# Patient Record
Sex: FEMALE | Race: BLACK OR AFRICAN AMERICAN | NOT HISPANIC OR LATINO
[De-identification: names, ages, dates, MRNs, and addresses within clinical notes are randomized per-mention and may not be internally consistent; named-entity substitution may affect disease eponyms.]

---

## 2020-09-09 ENCOUNTER — TRANSCRIPTION ENCOUNTER (OUTPATIENT)
Age: 25
End: 2020-09-09

## 2020-09-09 ENCOUNTER — NON-APPOINTMENT (OUTPATIENT)
Age: 25
End: 2020-09-09

## 2020-09-09 ENCOUNTER — APPOINTMENT (OUTPATIENT)
Dept: OBGYN | Facility: CLINIC | Age: 25
End: 2020-09-09
Payer: MEDICAID

## 2020-09-09 VITALS
SYSTOLIC BLOOD PRESSURE: 120 MMHG | TEMPERATURE: 99 F | DIASTOLIC BLOOD PRESSURE: 80 MMHG | BODY MASS INDEX: 32.62 KG/M2 | WEIGHT: 203 LBS | HEIGHT: 66 IN

## 2020-09-09 PROCEDURE — 0502F SUBSEQUENT PRENATAL CARE: CPT

## 2020-09-09 PROCEDURE — 36415 COLL VENOUS BLD VENIPUNCTURE: CPT

## 2020-09-09 PROCEDURE — 81015 MICROSCOPIC EXAM OF URINE: CPT

## 2020-09-09 PROCEDURE — 99213 OFFICE O/P EST LOW 20 MIN: CPT

## 2020-09-10 LAB — HIV1+2 AB SPEC QL IA.RAPID: NONREACTIVE

## 2020-09-11 LAB
ABO + RH PNL BLD: NORMAL
BASOPHILS # BLD AUTO: 0.03 K/UL
BASOPHILS NFR BLD AUTO: 0.4 %
BLD GP AB SCN SERPL QL: NORMAL
EOSINOPHIL # BLD AUTO: 0.21 K/UL
EOSINOPHIL NFR BLD AUTO: 2.5 %
GLUCOSE 1H P 50 G GLC PO SERPL-MCNC: 112 MG/DL
HCT VFR BLD CALC: 35 %
HGB BLD-MCNC: 10.8 G/DL
IMM GRANULOCYTES NFR BLD AUTO: 0.4 %
LYMPHOCYTES # BLD AUTO: 2.09 K/UL
LYMPHOCYTES NFR BLD AUTO: 25.1 %
MAN DIFF?: NORMAL
MCHC RBC-ENTMCNC: 29 PG
MCHC RBC-ENTMCNC: 30.9 GM/DL
MCV RBC AUTO: 93.8 FL
MONOCYTES # BLD AUTO: 0.41 K/UL
MONOCYTES NFR BLD AUTO: 4.9 %
NEUTROPHILS # BLD AUTO: 5.57 K/UL
NEUTROPHILS NFR BLD AUTO: 66.7 %
PLATELET # BLD AUTO: 235 K/UL
RBC # BLD: 3.73 M/UL
RBC # FLD: 14.4 %
SARS-COV-2 IGG SERPL IA-ACNC: <0.1 INDEX
SARS-COV-2 IGG SERPL QL IA: NEGATIVE
T PALLIDUM AB SER QL IA: NEGATIVE
WBC # FLD AUTO: 8.34 K/UL

## 2020-09-24 ENCOUNTER — NON-APPOINTMENT (OUTPATIENT)
Age: 25
End: 2020-09-24

## 2020-09-25 ENCOUNTER — RESULT REVIEW (OUTPATIENT)
Age: 25
End: 2020-09-25

## 2020-09-25 ENCOUNTER — APPOINTMENT (OUTPATIENT)
Dept: HEMATOLOGY ONCOLOGY | Facility: CLINIC | Age: 25
End: 2020-09-25
Payer: MEDICAID

## 2020-09-25 VITALS
WEIGHT: 205 LBS | HEIGHT: 66.93 IN | DIASTOLIC BLOOD PRESSURE: 64 MMHG | TEMPERATURE: 97.3 F | HEART RATE: 100 BPM | SYSTOLIC BLOOD PRESSURE: 104 MMHG | OXYGEN SATURATION: 98 % | BODY MASS INDEX: 32.18 KG/M2 | RESPIRATION RATE: 20 BRPM

## 2020-09-25 DIAGNOSIS — Z81.8 FAMILY HISTORY OF OTHER MENTAL AND BEHAVIORAL DISORDERS: ICD-10-CM

## 2020-09-25 DIAGNOSIS — Z80.0 FAMILY HISTORY OF MALIGNANT NEOPLASM OF DIGESTIVE ORGANS: ICD-10-CM

## 2020-09-25 DIAGNOSIS — Z83.3 FAMILY HISTORY OF DIABETES MELLITUS: ICD-10-CM

## 2020-09-25 DIAGNOSIS — Z86.2 PERSONAL HISTORY OF DISEASES OF THE BLOOD AND BLOOD-FORMING ORGANS AND CERTAIN DISORDERS INVOLVING THE IMMUNE MECHANISM: ICD-10-CM

## 2020-09-25 PROCEDURE — 99205 OFFICE O/P NEW HI 60 MIN: CPT

## 2020-09-25 RX ORDER — PNV/FERROUS SULFATE/FOLIC ACID 27-<0.5MG
TABLET ORAL
Refills: 0 | Status: ACTIVE | COMMUNITY

## 2020-09-25 NOTE — HISTORY OF PRESENT ILLNESS
[de-identified] : 25 year old female presents today for initial consultation of anemia during pregnancy, referred by Dr. Yang.  Patient is currently 29 weeks pregnant with an AVIVA of 12/6/2020.  Labs dated 9/10/2020 show a HGB 10.8, normal HCT and MCV. No ferritin on chart.   Patient complains of bad headaches, excessive fatigue and feeling cold.  Denies any chest pain or palpitations.  Patient states her last pregnancy she was not told she was anemic during- however after delivery she was told she was and she took po iron- unsure how she tolerated .   [de-identified] : \par

## 2020-09-25 NOTE — PHYSICAL EXAM
[Fully active, able to carry on all pre-disease performance without restriction] : Status 0 - Fully active, able to carry on all pre-disease performance without restriction [Normal] : affect appropriate [de-identified] : gravid

## 2020-09-25 NOTE — CONSULT LETTER
[Dear  ___] : Dear  [unfilled], [Consult Letter:] : I had the pleasure of evaluating your patient, [unfilled]. [Please see my note below.] : Please see my note below. [Consult Closing:] : Thank you very much for allowing me to participate in the care of this patient.  If you have any questions, please do not hesitate to contact me. [Sincerely,] : Sincerely, [FreeTextEntry3] : Kayla Thurman DO, F[place], FACP\par Medical Oncology and Hematology\par  [facility] [place]\par

## 2020-09-25 NOTE — ASSESSMENT
[FreeTextEntry1] : #anemia in pregnancy\par We have reviewed the diagnosis and causes of anemia. We have discussed that physiologic anemia of pregnancy and iron deficiency are the two most common causes of anemia in pregnant women. Will check CBC with diff, CMP, LDH, Haptoglobin, retic, iron studies with ferritin, B12/Folate.\par If her iron studies reveal iron deficiency, given her degree of anemia, would recommend parenteral iron in the form of venofer 200 mg administered x 5 doses. \par We have reviewed the side effect of venofer to include but are not limited to infusion reaction, headaches, nausea. \par \par #3rd trimester of pregnancy - follows with Dr. Yang\par \par #fatigue - 2/2 to above\par Will have her return in 1 week to discuss work up and for her to receive iron if needed.

## 2020-09-29 ENCOUNTER — NON-APPOINTMENT (OUTPATIENT)
Age: 25
End: 2020-09-29

## 2020-09-30 ENCOUNTER — APPOINTMENT (OUTPATIENT)
Dept: OBGYN | Facility: CLINIC | Age: 25
End: 2020-09-30

## 2020-10-02 ENCOUNTER — RESULT REVIEW (OUTPATIENT)
Age: 25
End: 2020-10-02

## 2020-10-02 ENCOUNTER — APPOINTMENT (OUTPATIENT)
Dept: HEMATOLOGY ONCOLOGY | Facility: CLINIC | Age: 25
End: 2020-10-02
Payer: MEDICAID

## 2020-10-02 VITALS
BODY MASS INDEX: 32.65 KG/M2 | RESPIRATION RATE: 20 BRPM | DIASTOLIC BLOOD PRESSURE: 60 MMHG | HEIGHT: 66.93 IN | OXYGEN SATURATION: 98 % | SYSTOLIC BLOOD PRESSURE: 102 MMHG | TEMPERATURE: 97.9 F | WEIGHT: 208 LBS | HEART RATE: 105 BPM

## 2020-10-02 PROCEDURE — 99214 OFFICE O/P EST MOD 30 MIN: CPT

## 2020-10-02 RX ADMIN — Medication 0 1 ML: at 00:00

## 2020-10-02 NOTE — PHYSICAL EXAM
[Fully active, able to carry on all pre-disease performance without restriction] : Status 0 - Fully active, able to carry on all pre-disease performance without restriction [Normal] : affect appropriate [de-identified] : gravid

## 2020-10-02 NOTE — CONSULT LETTER
[Dear  ___] : Dear  [unfilled], [Consult Letter:] : I had the pleasure of evaluating your patient, [unfilled]. [Please see my note below.] : Please see my note below. [Consult Closing:] : Thank you very much for allowing me to participate in the care of this patient.  If you have any questions, please do not hesitate to contact me. [Sincerely,] : Sincerely, [FreeTextEntry3] : Kayla Thurman DO, [place], FACP\par Medical Oncology and Hematology\par [facility] [place]\par

## 2020-10-02 NOTE — ASSESSMENT
[FreeTextEntry1] : #anemia in pregnancy\par work up reveals b12 deficiency - will give B12 injections 1000 mcg SC daily x 7 days followed by weekly for 4 weeks followed by monthly.\par Iron levels and ferritin still wnl. will recheck in 4 weeks to see if she needs iron\par ESR/CRP elevated - likely due to pregancy\par \par #3rd trimester of pregnancy - follows with Dr. Yang\par Had recent contractions - started on nifedipine and contractions stopped\par \par #fatigue - 2/2 to above\par \par RTC in 1 month with cbc with diff, cmp, iron studies, ferritin, b12, ESR/CRP

## 2020-10-02 NOTE — HISTORY OF PRESENT ILLNESS
[de-identified] : 25 year old female presents today for initial consultation of anemia during pregnancy, referred by Dr. Yang.  Patient is currently 29 weeks pregnant with an AVIVA of 12/6/2020.  Labs dated 9/10/2020 show a HGB 10.8, normal HCT and MCV. No ferritin on chart.   Patient complains of bad headaches, excessive fatigue and feeling cold.  Denies any chest pain or palpitations.  Patient states her last pregnancy she was not told she was anemic during- however after delivery she was told she was and she took po iron- unsure how she tolerated .   [de-identified] : Patient seen and examined and here today for follow up\par Was recently hospitalized for premature contractions\par Given nifedipine. Feeling better now

## 2020-10-05 ENCOUNTER — NON-APPOINTMENT (OUTPATIENT)
Age: 25
End: 2020-10-05

## 2020-10-05 ENCOUNTER — APPOINTMENT (OUTPATIENT)
Dept: OBGYN | Facility: CLINIC | Age: 25
End: 2020-10-05
Payer: MEDICAID

## 2020-10-05 VITALS
DIASTOLIC BLOOD PRESSURE: 80 MMHG | TEMPERATURE: 99.4 F | BODY MASS INDEX: 33.11 KG/M2 | HEIGHT: 66 IN | SYSTOLIC BLOOD PRESSURE: 122 MMHG | WEIGHT: 206 LBS

## 2020-10-05 PROCEDURE — 0502F SUBSEQUENT PRENATAL CARE: CPT

## 2020-10-06 RX ORDER — CYANOCOBALAMIN, ISOPROPYL ALCOHOL 1000MCG/ML
1000 KIT INJECTION
Qty: 20 | Refills: 0 | Status: COMPLETED | COMMUNITY
Start: 2020-10-02 | End: 2020-10-06

## 2020-10-08 ENCOUNTER — NON-APPOINTMENT (OUTPATIENT)
Age: 25
End: 2020-10-08

## 2020-10-23 ENCOUNTER — APPOINTMENT (OUTPATIENT)
Dept: OBGYN | Facility: CLINIC | Age: 25
End: 2020-10-23
Payer: MEDICAID

## 2020-10-23 ENCOUNTER — NON-APPOINTMENT (OUTPATIENT)
Age: 25
End: 2020-10-23

## 2020-10-23 VITALS
TEMPERATURE: 98.7 F | BODY MASS INDEX: 33.75 KG/M2 | WEIGHT: 210 LBS | SYSTOLIC BLOOD PRESSURE: 120 MMHG | DIASTOLIC BLOOD PRESSURE: 80 MMHG | HEIGHT: 66 IN

## 2020-10-23 PROCEDURE — 0502F SUBSEQUENT PRENATAL CARE: CPT

## 2020-11-13 ENCOUNTER — APPOINTMENT (OUTPATIENT)
Dept: OBGYN | Facility: CLINIC | Age: 25
End: 2020-11-13
Payer: MEDICAID

## 2020-11-13 ENCOUNTER — NON-APPOINTMENT (OUTPATIENT)
Age: 25
End: 2020-11-13

## 2020-11-13 VITALS
BODY MASS INDEX: 34.23 KG/M2 | WEIGHT: 213 LBS | SYSTOLIC BLOOD PRESSURE: 114 MMHG | HEIGHT: 66 IN | DIASTOLIC BLOOD PRESSURE: 62 MMHG

## 2020-11-13 DIAGNOSIS — Z00.00 ENCOUNTER FOR GENERAL ADULT MEDICAL EXAMINATION W/OUT ABNORMAL FINDINGS: ICD-10-CM

## 2020-11-13 LAB
BILIRUB UR QL STRIP: NEGATIVE
CLARITY UR: CLEAR
COLLECTION METHOD: NORMAL
GLUCOSE UR-MCNC: NEGATIVE
HCG UR QL: 0.2 EU/DL
HGB UR QL STRIP.AUTO: NEGATIVE
KETONES UR-MCNC: NEGATIVE
LEUKOCYTE ESTERASE UR QL STRIP: NORMAL
NITRITE UR QL STRIP: NEGATIVE
PH UR STRIP: 7
PROT UR STRIP-MCNC: NEGATIVE
SP GR UR STRIP: 1.02

## 2020-11-13 PROCEDURE — 81015 MICROSCOPIC EXAM OF URINE: CPT

## 2020-11-13 PROCEDURE — 0502F SUBSEQUENT PRENATAL CARE: CPT

## 2020-11-16 ENCOUNTER — NON-APPOINTMENT (OUTPATIENT)
Age: 25
End: 2020-11-16

## 2020-11-16 LAB
BACTERIA UR CULT: NORMAL
GP B STREP DNA SPEC QL NAA+PROBE: DETECTED
GP B STREP DNA SPEC QL NAA+PROBE: NORMAL
SOURCE GBS: NORMAL

## 2020-11-19 ENCOUNTER — APPOINTMENT (OUTPATIENT)
Dept: OBGYN | Facility: CLINIC | Age: 25
End: 2020-11-19
Payer: MEDICAID

## 2020-11-19 ENCOUNTER — NON-APPOINTMENT (OUTPATIENT)
Age: 25
End: 2020-11-19

## 2020-11-19 VITALS
HEIGHT: 66 IN | DIASTOLIC BLOOD PRESSURE: 66 MMHG | WEIGHT: 212 LBS | BODY MASS INDEX: 34.07 KG/M2 | SYSTOLIC BLOOD PRESSURE: 110 MMHG

## 2020-11-19 LAB
BILIRUB UR QL STRIP: NEGATIVE
CLARITY UR: NORMAL
COLLECTION METHOD: NORMAL
GLUCOSE UR-MCNC: NEGATIVE
HCG UR QL: 0.2 EU/DL
HGB UR QL STRIP.AUTO: NEGATIVE
KETONES UR-MCNC: NEGATIVE
LEUKOCYTE ESTERASE UR QL STRIP: NORMAL
NITRITE UR QL STRIP: NEGATIVE
PH UR STRIP: 7
PROT UR STRIP-MCNC: NEGATIVE
SP GR UR STRIP: 1.02

## 2020-11-19 PROCEDURE — 0502F SUBSEQUENT PRENATAL CARE: CPT

## 2020-11-20 ENCOUNTER — RESULT REVIEW (OUTPATIENT)
Age: 25
End: 2020-11-20

## 2020-11-20 ENCOUNTER — APPOINTMENT (OUTPATIENT)
Dept: HEMATOLOGY ONCOLOGY | Facility: CLINIC | Age: 25
End: 2020-11-20
Payer: MEDICAID

## 2020-11-20 VITALS
WEIGHT: 215.06 LBS | OXYGEN SATURATION: 98 % | BODY MASS INDEX: 34.56 KG/M2 | DIASTOLIC BLOOD PRESSURE: 83 MMHG | HEIGHT: 66 IN | SYSTOLIC BLOOD PRESSURE: 124 MMHG | RESPIRATION RATE: 16 BRPM | TEMPERATURE: 97.4 F | HEART RATE: 128 BPM

## 2020-11-20 DIAGNOSIS — Z34.93 ENCOUNTER FOR SUPERVISION OF NORMAL PREGNANCY, UNSPECIFIED, THIRD TRIMESTER: ICD-10-CM

## 2020-11-20 PROCEDURE — 99214 OFFICE O/P EST MOD 30 MIN: CPT

## 2020-11-20 NOTE — HISTORY OF PRESENT ILLNESS
[de-identified] : 25 year old female presents today for initial consultation of anemia during pregnancy, referred by Dr. Yang.  Patient is currently 29 weeks pregnant with an AVIVA of 12/6/2020.  Labs dated 9/10/2020 show a HGB 10.8, normal HCT and MCV. No ferritin on chart.   Patient complains of bad headaches, excessive fatigue and feeling cold.  Denies any chest pain or palpitations.  Patient states her last pregnancy she was not told she was anemic during- however after delivery she was told she was and she took po iron- unsure how she tolerated .   [de-identified] : Patient seen and examined and here today for follow up.  She reports feeling well and is now 37 weeks gestation.\par

## 2020-11-20 NOTE — ASSESSMENT
[FreeTextEntry1] : #anemia in pregnancy\par work up reveals b12 deficiency - s/p B12 injections 1000 mcg SC daily x 4 days (due to insurance) followed by weekly for 5 weeks, followed by monthly starting in december.\par Iron levels and ferritin still wnl. will recheck in 4 weeks to see if she needs iron\par ESR/CRP elevated - likely due to pregnancy\par \par #3rd trimester of pregnancy - follows with Dr. Yang\par Had recent contractions - started on nifedipine and contractions stopped\par \par #fatigue - 2/2 to above\par \par Case and management discussed with Dr. Thurman\par RTC when 6-8 weeks postpartum with cbc with diff, cmp, iron studies, ferritin, b12, ESR/CRP

## 2020-11-20 NOTE — PHYSICAL EXAM
[Fully active, able to carry on all pre-disease performance without restriction] : Status 0 - Fully active, able to carry on all pre-disease performance without restriction [Normal] : affect appropriate [de-identified] :  abdomen

## 2020-11-27 ENCOUNTER — NON-APPOINTMENT (OUTPATIENT)
Age: 25
End: 2020-11-27

## 2020-11-27 ENCOUNTER — APPOINTMENT (OUTPATIENT)
Dept: OBGYN | Facility: CLINIC | Age: 25
End: 2020-11-27
Payer: MEDICAID

## 2020-11-27 VITALS
HEIGHT: 66 IN | WEIGHT: 215 LBS | BODY MASS INDEX: 34.55 KG/M2 | SYSTOLIC BLOOD PRESSURE: 126 MMHG | DIASTOLIC BLOOD PRESSURE: 68 MMHG

## 2020-11-27 LAB
BILIRUB UR QL STRIP: NEGATIVE
CLARITY UR: CLEAR
COLLECTION METHOD: NORMAL
GLUCOSE UR-MCNC: NEGATIVE
HCG UR QL: 0.2 EU/DL
HGB UR QL STRIP.AUTO: NEGATIVE
KETONES UR-MCNC: NEGATIVE
LEUKOCYTE ESTERASE UR QL STRIP: NORMAL
NITRITE UR QL STRIP: NEGATIVE
PH UR STRIP: 7.5
PROT UR STRIP-MCNC: NORMAL
SP GR UR STRIP: 1.02

## 2020-11-27 PROCEDURE — 0502F SUBSEQUENT PRENATAL CARE: CPT

## 2020-11-27 PROCEDURE — 81015 MICROSCOPIC EXAM OF URINE: CPT

## 2020-11-30 ENCOUNTER — NON-APPOINTMENT (OUTPATIENT)
Age: 25
End: 2020-11-30

## 2020-11-30 ENCOUNTER — APPOINTMENT (OUTPATIENT)
Dept: OBGYN | Facility: CLINIC | Age: 25
End: 2020-11-30
Payer: MEDICAID

## 2020-11-30 VITALS
BODY MASS INDEX: 34.72 KG/M2 | SYSTOLIC BLOOD PRESSURE: 110 MMHG | HEIGHT: 66 IN | WEIGHT: 216 LBS | DIASTOLIC BLOOD PRESSURE: 60 MMHG

## 2020-11-30 DIAGNOSIS — B95.1 STREPTOCOCCUS, GROUP B, AS THE CAUSE OF DISEASES CLASSIFIED ELSEWHERE: ICD-10-CM

## 2020-11-30 DIAGNOSIS — Z34.83 ENCOUNTER FOR SUPERVISION OF OTHER NORMAL PREGNANCY, THIRD TRIMESTER: ICD-10-CM

## 2020-11-30 LAB
BILIRUB UR QL STRIP: NORMAL
CLARITY UR: CLEAR
COLLECTION METHOD: NORMAL
GLUCOSE UR-MCNC: NORMAL
HCG UR QL: 0.2 EU/DL
HGB UR QL STRIP.AUTO: NORMAL
KETONES UR-MCNC: NORMAL
LEUKOCYTE ESTERASE UR QL STRIP: NORMAL
NITRITE UR QL STRIP: NORMAL
PH UR STRIP: 7
PROT UR STRIP-MCNC: NORMAL
SP GR UR STRIP: 1.02

## 2020-11-30 PROCEDURE — 59426 ANTEPARTUM CARE ONLY: CPT

## 2020-11-30 PROCEDURE — 0502F SUBSEQUENT PRENATAL CARE: CPT

## 2020-12-09 ENCOUNTER — APPOINTMENT (OUTPATIENT)
Dept: OBGYN | Facility: CLINIC | Age: 25
End: 2020-12-09

## 2021-01-15 ENCOUNTER — APPOINTMENT (OUTPATIENT)
Dept: OBGYN | Facility: CLINIC | Age: 26
End: 2021-01-15
Payer: MEDICAID

## 2021-01-15 VITALS
BODY MASS INDEX: 33.27 KG/M2 | SYSTOLIC BLOOD PRESSURE: 104 MMHG | DIASTOLIC BLOOD PRESSURE: 60 MMHG | WEIGHT: 207 LBS | HEIGHT: 66 IN

## 2021-01-15 DIAGNOSIS — Z3A.39 39 WEEKS GESTATION OF PREGNANCY: ICD-10-CM

## 2021-01-15 DIAGNOSIS — Z3A.30 30 WEEKS GESTATION OF PREGNANCY: ICD-10-CM

## 2021-01-15 DIAGNOSIS — Z3A.27 27 WEEKS GESTATION OF PREGNANCY: ICD-10-CM

## 2021-01-15 PROCEDURE — 0503F POSTPARTUM CARE VISIT: CPT

## 2021-01-19 ENCOUNTER — RX CHANGE (OUTPATIENT)
Age: 26
End: 2021-01-19

## 2021-01-20 ENCOUNTER — RX CHANGE (OUTPATIENT)
Age: 26
End: 2021-01-20

## 2021-02-22 ENCOUNTER — APPOINTMENT (OUTPATIENT)
Dept: HEMATOLOGY ONCOLOGY | Facility: CLINIC | Age: 26
End: 2021-02-22
Payer: MEDICAID

## 2021-02-22 ENCOUNTER — RESULT REVIEW (OUTPATIENT)
Age: 26
End: 2021-02-22

## 2021-02-22 VITALS
DIASTOLIC BLOOD PRESSURE: 73 MMHG | HEART RATE: 84 BPM | SYSTOLIC BLOOD PRESSURE: 112 MMHG | TEMPERATURE: 96.7 F | BODY MASS INDEX: 33.24 KG/M2 | HEIGHT: 67.2 IN | WEIGHT: 214.3 LBS | RESPIRATION RATE: 16 BRPM | OXYGEN SATURATION: 97 %

## 2021-02-22 DIAGNOSIS — O99.013 ANEMIA COMPLICATING PREGNANCY, THIRD TRIMESTER: ICD-10-CM

## 2021-02-22 PROCEDURE — 99072 ADDL SUPL MATRL&STAF TM PHE: CPT

## 2021-02-22 PROCEDURE — 99212 OFFICE O/P EST SF 10 MIN: CPT

## 2021-02-22 NOTE — HISTORY OF PRESENT ILLNESS
[de-identified] : 25 year old female presents today for initial consultation of anemia during pregnancy, referred by Dr. Yang.  Patient is currently 29 weeks pregnant with an AVIVA of 12/6/2020.  Labs dated 9/10/2020 show a HGB 10.8, normal HCT and MCV. No ferritin on chart.   Patient complains of bad headaches, excessive fatigue and feeling cold.  Denies any chest pain or palpitations.  Patient states her last pregnancy she was not told she was anemic during- however after delivery she was told she was and she took po iron- unsure how she tolerated .   [de-identified] : Patient seen and examined and here today for follow up.  She reports feeling well, her delivery went well without any complications, and her son is now 8 weeks old.  \par She denies feeling dizzy, lightheaded, SOB, palpitations, nausea, vomiting, constipation, diarrhea, and bleeding. She needs to follow up with her ob/gyn for postpartum visit. \par

## 2021-02-22 NOTE — ASSESSMENT
[FreeTextEntry1] : #anemia in pregnancy\par work up reveals b12 deficiency - s/p B12 injections 1000 mcg SC daily x 4 days (due to insurance) followed by weekly for 5 weeks, followed by monthly starting in december.\par Iron levels and ferritin still wnl. will recheck in 4 weeks to see if she needs iron\par ESR/CRP elevated - likely due to pregnancy\par \par #postpartum - follows with Dr. Yang\par to start OCP\par \par #fatigue - 2/2 to above\par \par Case and management discussed with Dr. Thurman\par RTC when 3-4 mos cbc with diff, cmp, iron studies, ferritin, b12, ESR/CRP

## 2021-06-22 ENCOUNTER — APPOINTMENT (OUTPATIENT)
Dept: HEMATOLOGY ONCOLOGY | Facility: CLINIC | Age: 26
End: 2021-06-22
Payer: MEDICAID

## 2021-07-13 ENCOUNTER — RESULT REVIEW (OUTPATIENT)
Age: 26
End: 2021-07-13

## 2021-07-13 ENCOUNTER — APPOINTMENT (OUTPATIENT)
Dept: HEMATOLOGY ONCOLOGY | Facility: CLINIC | Age: 26
End: 2021-07-13
Payer: MEDICAID

## 2021-07-13 VITALS
WEIGHT: 212 LBS | BODY MASS INDEX: 32.89 KG/M2 | SYSTOLIC BLOOD PRESSURE: 103 MMHG | TEMPERATURE: 97.6 F | DIASTOLIC BLOOD PRESSURE: 66 MMHG | OXYGEN SATURATION: 100 % | HEART RATE: 67 BPM | RESPIRATION RATE: 16 BRPM | HEIGHT: 67.17 IN

## 2021-07-13 PROCEDURE — 99072 ADDL SUPL MATRL&STAF TM PHE: CPT

## 2021-07-13 PROCEDURE — 99214 OFFICE O/P EST MOD 30 MIN: CPT

## 2021-07-13 NOTE — ASSESSMENT
[FreeTextEntry1] : #anemia in pregnancy\par work up reveals b12 deficiency - s/p B12 injections 1000 mcg SC daily x 4 days (due to insurance) followed by weekly for 5 weeks, followed by monthly starting in december.\par Iron levels and ferritin still wnl. will recheck in 4 weeks to see if she needs iron\par ESR/CRP elevated - likely due to pregnancy\par \par 7/13/2021 - hgb 11.6, checking iron, ferritin, b12. complains of fatigue- Will add vit D and TSH. will likely need to resume b12 injections if low normal. Does wake up in the middle of the night due to the baby getting up\par \par #postpartum - follows with Dr. Yang\par \par #fatigue - 2/2 to above\par \par RTC when 4 mos cbc with diff, cmp, iron studies, ferritin, b12, ESR/CRP, TSH, vit D

## 2021-07-13 NOTE — HISTORY OF PRESENT ILLNESS
[de-identified] : 25 year old female presents today for initial consultation of anemia during pregnancy, referred by Dr. Yang.  Patient is currently 29 weeks pregnant with an AVIVA of 12/6/2020.  Labs dated 9/10/2020 show a HGB 10.8, normal HCT and MCV. No ferritin on chart.   Patient complains of bad headaches, excessive fatigue and feeling cold.  Denies any chest pain or palpitations.  Patient states her last pregnancy she was not told she was anemic during- however after delivery she was told she was and she took po iron- unsure how she tolerated .   [de-identified] : Patient seen and examined and here today for follow up. \par Complains of fatigue. Does wake up in the middle of the night due to the baby getting up.\par

## 2021-10-15 ENCOUNTER — APPOINTMENT (OUTPATIENT)
Dept: HEMATOLOGY ONCOLOGY | Facility: CLINIC | Age: 26
End: 2021-10-15

## 2021-12-03 ENCOUNTER — RESULT REVIEW (OUTPATIENT)
Age: 26
End: 2021-12-03

## 2021-12-03 ENCOUNTER — APPOINTMENT (OUTPATIENT)
Dept: HEMATOLOGY ONCOLOGY | Facility: CLINIC | Age: 26
End: 2021-12-03
Payer: MEDICAID

## 2021-12-03 VITALS
HEIGHT: 67.17 IN | TEMPERATURE: 97.3 F | BODY MASS INDEX: 30.95 KG/M2 | WEIGHT: 199.5 LBS | SYSTOLIC BLOOD PRESSURE: 120 MMHG | OXYGEN SATURATION: 99 % | DIASTOLIC BLOOD PRESSURE: 71 MMHG | RESPIRATION RATE: 16 BRPM | HEART RATE: 60 BPM

## 2021-12-03 DIAGNOSIS — E66.9 OBESITY, UNSPECIFIED: ICD-10-CM

## 2021-12-03 PROCEDURE — 99214 OFFICE O/P EST MOD 30 MIN: CPT

## 2021-12-03 PROCEDURE — 36415 COLL VENOUS BLD VENIPUNCTURE: CPT

## 2021-12-03 NOTE — HISTORY OF PRESENT ILLNESS
[de-identified] : 25 year old female presents today for initial consultation of anemia during pregnancy, referred by Dr. Yang.  Patient is currently 29 weeks pregnant with an AVIVA of 12/6/2020.  Labs dated 9/10/2020 show a HGB 10.8, normal HCT and MCV. No ferritin on chart.   Patient complains of bad headaches, excessive fatigue and feeling cold.  Denies any chest pain or palpitations.  Patient states her last pregnancy she was not told she was anemic during- however after delivery she was told she was and she took po iron- unsure how she tolerated .   [de-identified] : Patient seen and examined and here today for follow up. \par Remains tired but baby still doesn't sleep at night\par

## 2021-12-03 NOTE — ASSESSMENT
[FreeTextEntry1] : #anemia \par 12/3/2021 - hgb 11.5. complains of fatigue - pending iron, b12, ferritin, TSH, VITD\par \par #fatigue - 2/2 to above, iron studies, ferritin, b12, ESR/CRP, TSH, vit D\par \par #Obese\par advised weightloss with diet and exercise\par \par RTC when 6 mos cbc with diff, cmp, iron studies, ferritin, b12, ESR/CRP, TSH, vit D

## 2022-06-03 ENCOUNTER — APPOINTMENT (OUTPATIENT)
Dept: HEMATOLOGY ONCOLOGY | Facility: CLINIC | Age: 27
End: 2022-06-03

## 2022-12-05 ENCOUNTER — APPOINTMENT (OUTPATIENT)
Dept: OBGYN | Facility: CLINIC | Age: 27
End: 2022-12-05

## 2022-12-05 ENCOUNTER — ASOB RESULT (OUTPATIENT)
Age: 27
End: 2022-12-05

## 2022-12-05 DIAGNOSIS — N92.6 IRREGULAR MENSTRUATION, UNSPECIFIED: ICD-10-CM

## 2022-12-05 PROCEDURE — 99214 OFFICE O/P EST MOD 30 MIN: CPT

## 2022-12-05 PROCEDURE — 76817 TRANSVAGINAL US OBSTETRIC: CPT

## 2022-12-08 VITALS — WEIGHT: 167 LBS | SYSTOLIC BLOOD PRESSURE: 112 MMHG | DIASTOLIC BLOOD PRESSURE: 62 MMHG | BODY MASS INDEX: 26.02 KG/M2

## 2022-12-08 PROBLEM — N92.6 MISSED MENSES: Status: ACTIVE | Noted: 2022-12-08

## 2022-12-08 RX ORDER — SYRINGE AND NEEDLE,INSULIN,1ML 25GX1"
25G X 5/8" SYRINGE, EMPTY DISPOSABLE MISCELLANEOUS
Qty: 10 | Refills: 0 | Status: DISCONTINUED | COMMUNITY
Start: 2020-10-06 | End: 2022-12-08

## 2022-12-08 RX ORDER — NORETHINDRONE ACETATE AND ETHINYL ESTRADIOL, ETHINYL ESTRADIOL AND FERROUS FUMARATE 1MG-10(24)
1 MG-10 MCG / KIT ORAL
Qty: 84 | Refills: 3 | Status: DISCONTINUED | COMMUNITY
Start: 2021-01-15 | End: 2022-12-08

## 2022-12-08 RX ORDER — CYANOCOBALAMIN 1000 UG/ML
1000 INJECTION INTRAMUSCULAR; SUBCUTANEOUS
Qty: 12 | Refills: 0 | Status: DISCONTINUED | COMMUNITY
Start: 2020-10-06 | End: 2022-12-08

## 2022-12-08 NOTE — PLAN
[FreeTextEntry1] : Early pregnancy\par unplanned but adjusting\par \par Reviewed early pregnancy and genetic screening \par Saw Dr Hedrick in prior pregnancies \par will see him this pregnancy\par \par Answered questions\par nurses reviewed OB packets\par \par Fanta Aguilar MD, PhD\par

## 2022-12-08 NOTE — HISTORY OF PRESENT ILLNESS
[FreeTextEntry1] : Herlinda is a 28 yo G2(3)  who presents with missed menses\par  at visit\par \par Patient known to our practice from prior pregnancies\par \par Unplanned pregnancy\par \par Reviewed ultrasound\par EDC 7/22/23\par \par \par \par Adjusting to news\par feels well\par \par

## 2023-01-04 ENCOUNTER — APPOINTMENT (OUTPATIENT)
Dept: OBGYN | Facility: CLINIC | Age: 28
End: 2023-01-04
Payer: MEDICAID

## 2023-01-04 VITALS
DIASTOLIC BLOOD PRESSURE: 58 MMHG | HEIGHT: 67.17 IN | WEIGHT: 168 LBS | BODY MASS INDEX: 26.06 KG/M2 | SYSTOLIC BLOOD PRESSURE: 103 MMHG

## 2023-01-04 DIAGNOSIS — Z34.90 ENCOUNTER FOR SUPERVISION OF NORMAL PREGNANCY, UNSPECIFIED, UNSPECIFIED TRIMESTER: ICD-10-CM

## 2023-01-04 DIAGNOSIS — Z11.52 ENCOUNTER FOR SCREENING FOR COVID-19: ICD-10-CM

## 2023-01-04 DIAGNOSIS — Z13.71 ENCOUNTER FOR NONPROCREATIVE SCREENING FOR GENETIC DISEASE CARRIER STATUS: ICD-10-CM

## 2023-01-04 PROCEDURE — 99213 OFFICE O/P EST LOW 20 MIN: CPT

## 2023-01-05 LAB
APPEARANCE: CLEAR
BASOPHILS # BLD AUTO: 0.02 K/UL
BASOPHILS NFR BLD AUTO: 0.3 %
BILIRUBIN URINE: NEGATIVE
BLOOD URINE: NEGATIVE
C TRACH RRNA SPEC QL NAA+PROBE: NOT DETECTED
COLOR: YELLOW
EOSINOPHIL # BLD AUTO: 0.13 K/UL
EOSINOPHIL NFR BLD AUTO: 1.8 %
GLUCOSE QUALITATIVE U: NEGATIVE
HCT VFR BLD CALC: 35 %
HGB BLD-MCNC: 11.2 G/DL
IMM GRANULOCYTES NFR BLD AUTO: 0.3 %
KETONES URINE: NEGATIVE
LEUKOCYTE ESTERASE URINE: NEGATIVE
LYMPHOCYTES # BLD AUTO: 1.7 K/UL
LYMPHOCYTES NFR BLD AUTO: 23.4 %
MAN DIFF?: NORMAL
MCHC RBC-ENTMCNC: 29.8 PG
MCHC RBC-ENTMCNC: 32 GM/DL
MCV RBC AUTO: 93.1 FL
MONOCYTES # BLD AUTO: 0.27 K/UL
MONOCYTES NFR BLD AUTO: 3.7 %
N GONORRHOEA RRNA SPEC QL NAA+PROBE: NOT DETECTED
NEUTROPHILS # BLD AUTO: 5.13 K/UL
NEUTROPHILS NFR BLD AUTO: 70.5 %
NITRITE URINE: NEGATIVE
PH URINE: 6.5
PLATELET # BLD AUTO: 250 K/UL
PROTEIN URINE: NORMAL
RBC # BLD: 3.76 M/UL
RBC # FLD: 13.4 %
SOURCE AMPLIFICATION: NORMAL
SPECIFIC GRAVITY URINE: 1.03
UROBILINOGEN URINE: NORMAL
WBC # FLD AUTO: 7.27 K/UL

## 2023-01-06 LAB
ABO + RH PNL BLD: NORMAL
BACTERIA UR CULT: NORMAL
BLD GP AB SCN SERPL QL: NORMAL

## 2023-01-09 ENCOUNTER — APPOINTMENT (OUTPATIENT)
Dept: HEMATOLOGY ONCOLOGY | Facility: CLINIC | Age: 28
End: 2023-01-09
Payer: MEDICAID

## 2023-01-09 ENCOUNTER — RESULT REVIEW (OUTPATIENT)
Age: 28
End: 2023-01-09

## 2023-01-09 VITALS
RESPIRATION RATE: 16 BRPM | BODY MASS INDEX: 26.37 KG/M2 | SYSTOLIC BLOOD PRESSURE: 106 MMHG | HEART RATE: 89 BPM | HEIGHT: 67.17 IN | OXYGEN SATURATION: 100 % | WEIGHT: 170 LBS | DIASTOLIC BLOOD PRESSURE: 64 MMHG | TEMPERATURE: 98 F

## 2023-01-09 DIAGNOSIS — Z34.91 ENCOUNTER FOR SUPERVISION OF NORMAL PREGNANCY, UNSPECIFIED, FIRST TRIMESTER: ICD-10-CM

## 2023-01-09 PROCEDURE — 99214 OFFICE O/P EST MOD 30 MIN: CPT | Mod: 25

## 2023-01-09 NOTE — REVIEW OF SYSTEMS
[Negative] : Allergic/Immunologic [Fatigue] : fatigue [FreeTextEntry2] : review of systems completed, negative unless otherwise noted above

## 2023-01-09 NOTE — HISTORY OF PRESENT ILLNESS
[de-identified] : 25 year old female presents today for initial consultation of anemia during pregnancy, referred by Dr. Yang.  Patient is currently 29 weeks pregnant with an AVIVA of 12/6/2020.  Labs dated 9/10/2020 show a HGB 10.8, normal HCT and MCV. No ferritin on chart.   Patient complains of bad headaches, excessive fatigue and feeling cold.  Denies any chest pain or palpitations.  Patient states her last pregnancy she was not told she was anemic during- however after delivery she was told she was and she took po iron- unsure how she tolerated .   [de-identified] : Patient seen and examined and here today for follow up. \par Remains tired but baby still doesn't sleep at night\par

## 2023-01-09 NOTE — HISTORY OF PRESENT ILLNESS
[de-identified] : 25 year old female presents today for initial consultation of anemia during pregnancy, referred by Dr. Yang.  Patient is currently 29 weeks pregnant with an AVIVA of 12/6/2020.  Labs dated 9/10/2020 show a HGB 10.8, normal HCT and MCV. No ferritin on chart.   Patient complains of bad headaches, excessive fatigue and feeling cold.  Denies any chest pain or palpitations.  Patient states her last pregnancy she was not told she was anemic during- however after delivery she was told she was and she took po iron- unsure how she tolerated .   [de-identified] : Patient seen and examined and here today for follow up. \par Remains tired but baby still doesn't sleep at night\par

## 2023-01-09 NOTE — ASSESSMENT
[FreeTextEntry1] : #anemia in pregnancy\par 12/3/2021 - hgb 11.5. complains of fatigue - pending iron, b12, ferritin, TSH, VITD\par 1/9/2023 vs and CBC reviewed; WBC 8.8, hgb 10.5, plt 267; irons pending; if iron deficient will not replete until out of firs trimester\par \par #fatigue - 2/2 to above, iron studies, ferritin, b12, ESR/CRP, TSH, vit D\par 1/9/2023 likely related to MARGIE\par \par #pagophagia\par 1/9/2023 likely related to MARGIE\par \par #wellness\par 1/9/2023 patient requested contact information for someone to talk to; contact information of wellness center given to patient\par \par Case and management discussed with Dr. Thurman\par RTC 10 weeks cbc with diff, cmp, iron studies, ferritin, b12

## 2023-01-10 RX ORDER — MELATONIN 3 MG
25 MCG TABLET ORAL
Qty: 30 | Refills: 1 | Status: ACTIVE | COMMUNITY
Start: 2023-01-10 | End: 1900-01-01

## 2023-01-10 RX ORDER — ACETAMINOPHEN/DIPHENHYDRAMINE 500MG-25MG
1000 TABLET ORAL
Qty: 90 | Refills: 1 | Status: ACTIVE | COMMUNITY
Start: 2023-01-10 | End: 1900-01-01

## 2023-01-12 ENCOUNTER — NON-APPOINTMENT (OUTPATIENT)
Age: 28
End: 2023-01-12

## 2023-01-12 ENCOUNTER — APPOINTMENT (OUTPATIENT)
Dept: OBGYN | Facility: CLINIC | Age: 28
End: 2023-01-12

## 2023-01-12 LAB — CYTOLOGY CVX/VAG DOC THIN PREP: NORMAL

## 2023-01-14 LAB
COVID-19 NUCLEOCAPSID  GAM ANTIBODY INTERPRETATION: POSITIVE
COVID-19 SPIKE DOMAIN ANTIBODY INTERPRETATION: POSITIVE
HBV SURFACE AG SER QL: NONREACTIVE
HCV AB SER QL: NONREACTIVE
HCV S/CO RATIO: 0.06 S/CO
HIV1+2 AB SPEC QL IA.RAPID: NONREACTIVE
SARS-COV-2 AB SERPL IA-ACNC: >250 U/ML
SARS-COV-2 AB SERPL QL IA: 141 INDEX

## 2023-01-17 ENCOUNTER — NON-APPOINTMENT (OUTPATIENT)
Age: 28
End: 2023-01-17

## 2023-01-17 LAB
MEV IGG FLD QL IA: <5 AU/ML
MEV IGG+IGM SER-IMP: NEGATIVE
RUBV IGG FLD-ACNC: 0.8 INDEX
RUBV IGG SER-IMP: NEGATIVE

## 2023-01-23 ENCOUNTER — NON-APPOINTMENT (OUTPATIENT)
Age: 28
End: 2023-01-23

## 2023-02-02 ENCOUNTER — APPOINTMENT (OUTPATIENT)
Dept: OBGYN | Facility: CLINIC | Age: 28
End: 2023-02-02
Payer: MEDICAID

## 2023-02-02 VITALS
HEIGHT: 67.17 IN | WEIGHT: 174 LBS | SYSTOLIC BLOOD PRESSURE: 108 MMHG | DIASTOLIC BLOOD PRESSURE: 60 MMHG | BODY MASS INDEX: 26.99 KG/M2

## 2023-02-02 LAB
BILIRUB UR QL STRIP: NEGATIVE
CLARITY UR: NORMAL
COLLECTION METHOD: NORMAL
GLUCOSE UR-MCNC: NEGATIVE
HCG UR QL: 0.2 EU/DL
HGB UR QL STRIP.AUTO: NEGATIVE
KETONES UR-MCNC: NEGATIVE
LEUKOCYTE ESTERASE UR QL STRIP: NEGATIVE
NITRITE UR QL STRIP: NEGATIVE
PH UR STRIP: 6.5
PROT UR STRIP-MCNC: NORMAL
SP GR UR STRIP: 1.02

## 2023-02-02 PROCEDURE — 0502F SUBSEQUENT PRENATAL CARE: CPT

## 2023-02-28 ENCOUNTER — NON-APPOINTMENT (OUTPATIENT)
Age: 28
End: 2023-02-28

## 2023-03-02 ENCOUNTER — APPOINTMENT (OUTPATIENT)
Dept: OBGYN | Facility: CLINIC | Age: 28
End: 2023-03-02
Payer: MEDICAID

## 2023-03-02 ENCOUNTER — APPOINTMENT (OUTPATIENT)
Dept: OBGYN | Facility: CLINIC | Age: 28
End: 2023-03-02

## 2023-03-02 PROCEDURE — 0502F SUBSEQUENT PRENATAL CARE: CPT

## 2023-03-04 LAB
BILIRUB UR QL STRIP: NORMAL
CLARITY UR: CLEAR
COLLECTION METHOD: NORMAL
GLUCOSE UR-MCNC: NORMAL
HCG UR QL: 0.2 EU/DL
HGB UR QL STRIP.AUTO: NORMAL
KETONES UR-MCNC: NORMAL
LEUKOCYTE ESTERASE UR QL STRIP: NORMAL
NITRITE UR QL STRIP: NORMAL
PH UR STRIP: 7.5
PROT UR STRIP-MCNC: NORMAL
SP GR UR STRIP: 1.02

## 2023-03-06 LAB
AFP MOM: 1.55
AFP VALUE: 88.1 NG/ML
ALPHA FETOPROTEIN SERUM COMMENT: NORMAL
ALPHA FETOPROTEIN SERUM INTERPRETATION: NORMAL
ALPHA FETOPROTEIN SERUM RESULTS: NORMAL
ALPHA FETOPROTEIN SERUM TEST RESULTS: NORMAL
GESTATIONAL AGE BASED ON: NORMAL
GESTATIONAL AGE ON COLLECTION DATE: 19.7 WEEKS
INSULIN DEP DIABETES: NO
MATERNAL AGE AT EDD AFP: 30 YR
MULTIPLE GESTATION: NO
OSBR RISK 1 IN: 4785
RACE: NORMAL
WEIGHT AFP: 174 LBS

## 2023-03-07 DIAGNOSIS — F50.89 OTHER SPECIFIED EATING DISORDER: ICD-10-CM

## 2023-03-07 DIAGNOSIS — E55.9 VITAMIN D DEFICIENCY, UNSPECIFIED: ICD-10-CM

## 2023-03-07 DIAGNOSIS — E53.8 DEFICIENCY OF OTHER SPECIFIED B GROUP VITAMINS: ICD-10-CM

## 2023-03-07 DIAGNOSIS — R79.82 ELEVATED C-REACTIVE PROTEIN (CRP): ICD-10-CM

## 2023-03-16 ENCOUNTER — NON-APPOINTMENT (OUTPATIENT)
Age: 28
End: 2023-03-16

## 2023-03-17 ENCOUNTER — APPOINTMENT (OUTPATIENT)
Dept: HEMATOLOGY ONCOLOGY | Facility: CLINIC | Age: 28
End: 2023-03-17
Payer: MEDICAID

## 2023-03-17 ENCOUNTER — RESULT REVIEW (OUTPATIENT)
Age: 28
End: 2023-03-17

## 2023-03-17 VITALS
OXYGEN SATURATION: 100 % | HEIGHT: 67 IN | DIASTOLIC BLOOD PRESSURE: 64 MMHG | TEMPERATURE: 96.5 F | RESPIRATION RATE: 16 BRPM | BODY MASS INDEX: 28.43 KG/M2 | WEIGHT: 181.13 LBS | SYSTOLIC BLOOD PRESSURE: 105 MMHG | HEART RATE: 87 BPM

## 2023-03-17 DIAGNOSIS — R53.83 OTHER FATIGUE: ICD-10-CM

## 2023-03-17 DIAGNOSIS — D64.9 ANEMIA, UNSPECIFIED: ICD-10-CM

## 2023-03-17 DIAGNOSIS — Z34.92 ENCOUNTER FOR SUPERVISION OF NORMAL PREGNANCY, UNSPECIFIED, SECOND TRIMESTER: ICD-10-CM

## 2023-03-17 PROCEDURE — 99213 OFFICE O/P EST LOW 20 MIN: CPT | Mod: 25

## 2023-03-17 NOTE — ASSESSMENT
[FreeTextEntry1] : #anemia in pregnancy\par 12/3/2021 - hgb 11.5. complains of fatigue - pending iron, b12, ferritin, TSH, VITD\par 1/9/2023 vs and CBC reviewed; WBC 8.8, hgb 10.5, plt 267; irons pending; if iron deficient will not replete until out of firs trimester\par 3/17/2023 vs and CBC reviewed; WBC 9.9, hgb 10.4 plt 239; irons pending\par \par #fatigue - 2/2 to above, iron studies, ferritin, b12, ESR/CRP, TSH, vit D\par 1/9/2023 likely related to MARGIE\par 3/17/2023 irons pending\par \par #wellness\par 1/9/2023 patient requested contact information for someone to talk to; contact information of wellness center given to patient\par \par RTC 10 weeks cbc with diff, cmp, iron studies, ferritin, b12

## 2023-03-17 NOTE — PHYSICAL EXAM
[Fully active, able to carry on all pre-disease performance without restriction] : Status 0 - Fully active, able to carry on all pre-disease performance without restriction [Normal] : affect appropriate [de-identified] : gravid

## 2023-03-17 NOTE — HISTORY OF PRESENT ILLNESS
[de-identified] : 25 year old female presents today for initial consultation of anemia during pregnancy, referred by Dr. Yang.  Patient is currently 29 weeks pregnant with an AVIVA of 12/6/2020.  Labs dated 9/10/2020 show a HGB 10.8, normal HCT and MCV. No ferritin on chart.   Patient complains of bad headaches, excessive fatigue and feeling cold.  Denies any chest pain or palpitations.  Patient states her last pregnancy she was not told she was anemic during- however after delivery she was told she was and she took po iron- unsure how she tolerated .   [de-identified] : Patient seen and examined and here today for follow up. \par The patient reports she is 22 weeks gestation and tired.\par She denies dizziness, lightheadedness, SOB, palpitations, nausea, vomiting, diarrhea, constipation, and bleeding. \par LMP 10/15/2022\par

## 2023-03-17 NOTE — REVIEW OF SYSTEMS
[Fatigue] : fatigue [Negative] : Allergic/Immunologic [FreeTextEntry2] : review of systems completed, negative unless otherwise noted above

## 2023-03-24 ENCOUNTER — NON-APPOINTMENT (OUTPATIENT)
Age: 28
End: 2023-03-24

## 2023-03-27 ENCOUNTER — APPOINTMENT (OUTPATIENT)
Dept: OBGYN | Facility: CLINIC | Age: 28
End: 2023-03-27
Payer: MEDICAID

## 2023-03-27 VITALS
SYSTOLIC BLOOD PRESSURE: 100 MMHG | DIASTOLIC BLOOD PRESSURE: 60 MMHG | HEIGHT: 67 IN | WEIGHT: 182 LBS | BODY MASS INDEX: 28.56 KG/M2

## 2023-03-27 PROCEDURE — 99213 OFFICE O/P EST LOW 20 MIN: CPT

## 2023-04-05 LAB
BILIRUB UR QL STRIP: NORMAL
CLARITY UR: CLEAR
COLLECTION METHOD: NORMAL
GLUCOSE UR-MCNC: NORMAL
HCG UR QL: 0.2 EU/DL
HGB UR QL STRIP.AUTO: NORMAL
KETONES UR-MCNC: NORMAL
LEUKOCYTE ESTERASE UR QL STRIP: NORMAL
NITRITE UR QL STRIP: NORMAL
PH UR STRIP: 8.5
PROT UR STRIP-MCNC: 30
SP GR UR STRIP: 1.02

## 2023-04-06 ENCOUNTER — NON-APPOINTMENT (OUTPATIENT)
Age: 28
End: 2023-04-06

## 2023-04-25 ENCOUNTER — APPOINTMENT (OUTPATIENT)
Dept: OBGYN | Facility: CLINIC | Age: 28
End: 2023-04-25
Payer: MEDICAID

## 2023-04-25 VITALS
SYSTOLIC BLOOD PRESSURE: 106 MMHG | WEIGHT: 190 LBS | HEIGHT: 67 IN | BODY MASS INDEX: 29.82 KG/M2 | DIASTOLIC BLOOD PRESSURE: 70 MMHG

## 2023-04-25 PROCEDURE — 0502F SUBSEQUENT PRENATAL CARE: CPT

## 2023-04-26 LAB
BASOPHILS # BLD AUTO: 0.02 K/UL
BASOPHILS NFR BLD AUTO: 0.2 %
BILIRUB UR QL STRIP: NORMAL
CLARITY UR: CLEAR
COLLECTION METHOD: NORMAL
EOSINOPHIL # BLD AUTO: 0.21 K/UL
EOSINOPHIL NFR BLD AUTO: 2.2 %
GLUCOSE 1H P 50 G GLC PO SERPL-MCNC: 90 MG/DL
GLUCOSE UR-MCNC: NORMAL
HCG UR QL: 0.2 EU/DL
HCT VFR BLD CALC: 34.2 %
HGB BLD-MCNC: 10.8 G/DL
HGB UR QL STRIP.AUTO: NORMAL
IMM GRANULOCYTES NFR BLD AUTO: 0.5 %
KETONES UR-MCNC: NORMAL
LEUKOCYTE ESTERASE UR QL STRIP: NORMAL
LYMPHOCYTES # BLD AUTO: 2.01 K/UL
LYMPHOCYTES NFR BLD AUTO: 21.1 %
MAN DIFF?: NORMAL
MCHC RBC-ENTMCNC: 29.9 PG
MCHC RBC-ENTMCNC: 31.6 GM/DL
MCV RBC AUTO: 94.7 FL
MONOCYTES # BLD AUTO: 0.65 K/UL
MONOCYTES NFR BLD AUTO: 6.8 %
NEUTROPHILS # BLD AUTO: 6.57 K/UL
NEUTROPHILS NFR BLD AUTO: 69.2 %
NITRITE UR QL STRIP: NORMAL
PH UR STRIP: 6.5
PLATELET # BLD AUTO: 213 K/UL
PROT UR STRIP-MCNC: NORMAL
RBC # BLD: 3.61 M/UL
RBC # FLD: 14.4 %
SP GR UR STRIP: 1.01
WBC # FLD AUTO: 9.51 K/UL

## 2023-05-08 ENCOUNTER — NON-APPOINTMENT (OUTPATIENT)
Age: 28
End: 2023-05-08

## 2023-05-11 ENCOUNTER — NON-APPOINTMENT (OUTPATIENT)
Age: 28
End: 2023-05-11

## 2023-05-11 DIAGNOSIS — Z3A.29 29 WEEKS GESTATION OF PREGNANCY: ICD-10-CM

## 2023-05-12 ENCOUNTER — APPOINTMENT (OUTPATIENT)
Dept: OBGYN | Facility: CLINIC | Age: 28
End: 2023-05-12
Payer: MEDICAID

## 2023-05-12 VITALS
HEIGHT: 67 IN | SYSTOLIC BLOOD PRESSURE: 110 MMHG | DIASTOLIC BLOOD PRESSURE: 60 MMHG | WEIGHT: 195 LBS | BODY MASS INDEX: 30.61 KG/M2

## 2023-05-12 LAB
BILIRUB UR QL STRIP: NORMAL
CLARITY UR: CLEAR
COLLECTION METHOD: NORMAL
GLUCOSE UR-MCNC: NORMAL
HCG UR QL: 0.2 EU/DL
HGB UR QL STRIP.AUTO: NORMAL
KETONES UR-MCNC: NORMAL
LEUKOCYTE ESTERASE UR QL STRIP: NORMAL
NITRITE UR QL STRIP: NORMAL
PH UR STRIP: 6.5
PROT UR STRIP-MCNC: NORMAL
SP GR UR STRIP: 1.02

## 2023-05-12 PROCEDURE — 59425 ANTEPARTUM CARE ONLY: CPT

## 2023-05-12 PROCEDURE — 0502F SUBSEQUENT PRENATAL CARE: CPT

## 2023-05-19 ENCOUNTER — NON-APPOINTMENT (OUTPATIENT)
Age: 28
End: 2023-05-19

## 2023-05-28 ENCOUNTER — NON-APPOINTMENT (OUTPATIENT)
Age: 28
End: 2023-05-28

## 2023-05-29 ENCOUNTER — RESULT REVIEW (OUTPATIENT)
Age: 28
End: 2023-05-29

## 2023-05-31 ENCOUNTER — TRANSCRIPTION ENCOUNTER (OUTPATIENT)
Age: 28
End: 2023-05-31

## 2023-06-01 ENCOUNTER — APPOINTMENT (OUTPATIENT)
Dept: OBGYN | Facility: CLINIC | Age: 28
End: 2023-06-01

## 2023-06-12 ENCOUNTER — APPOINTMENT (OUTPATIENT)
Dept: OBGYN | Facility: CLINIC | Age: 28
End: 2023-06-12

## 2023-06-14 ENCOUNTER — APPOINTMENT (OUTPATIENT)
Dept: HEMATOLOGY ONCOLOGY | Facility: CLINIC | Age: 28
End: 2023-06-14

## 2023-06-27 ENCOUNTER — APPOINTMENT (OUTPATIENT)
Dept: OBGYN | Facility: CLINIC | Age: 28
End: 2023-06-27

## 2023-07-05 ENCOUNTER — APPOINTMENT (OUTPATIENT)
Dept: OBGYN | Facility: CLINIC | Age: 28
End: 2023-07-05

## 2023-07-10 ENCOUNTER — NON-APPOINTMENT (OUTPATIENT)
Age: 28
End: 2023-07-10

## 2023-07-11 ENCOUNTER — APPOINTMENT (OUTPATIENT)
Dept: OBGYN | Facility: CLINIC | Age: 28
End: 2023-07-11
Payer: MEDICAID

## 2023-07-11 VITALS
DIASTOLIC BLOOD PRESSURE: 60 MMHG | WEIGHT: 191 LBS | BODY MASS INDEX: 29.98 KG/M2 | HEIGHT: 67 IN | SYSTOLIC BLOOD PRESSURE: 100 MMHG

## 2023-07-12 ENCOUNTER — APPOINTMENT (OUTPATIENT)
Dept: OBGYN | Facility: CLINIC | Age: 28
End: 2023-07-12

## 2023-07-21 ENCOUNTER — APPOINTMENT (OUTPATIENT)
Dept: OBGYN | Facility: CLINIC | Age: 28
End: 2023-07-21

## 2023-09-15 ENCOUNTER — APPOINTMENT (OUTPATIENT)
Dept: OBGYN | Facility: CLINIC | Age: 28
End: 2023-09-15

## 2025-04-10 PROBLEM — Z01.419 ENCOUNTER FOR GYNECOLOGICAL EXAMINATION: Status: ACTIVE | Noted: 2025-04-10

## 2025-04-14 ENCOUNTER — APPOINTMENT (OUTPATIENT)
Dept: OBGYN | Facility: CLINIC | Age: 30
End: 2025-04-14
Payer: MEDICAID

## 2025-04-14 DIAGNOSIS — Z11.3 ENCOUNTER FOR SCREENING FOR INFECTIONS WITH A PREDOMINANTLY SEXUAL MODE OF TRANSMISSION: ICD-10-CM

## 2025-04-14 DIAGNOSIS — Z01.419 ENCOUNTER FOR GYNECOLOGICAL EXAMINATION (GENERAL) (ROUTINE) W/OUT ABNORMAL FINDINGS: ICD-10-CM

## 2025-04-14 PROCEDURE — 99459 PELVIC EXAMINATION: CPT

## 2025-04-14 PROCEDURE — 99395 PREV VISIT EST AGE 18-39: CPT

## 2025-04-15 LAB
C TRACH RRNA SPEC QL NAA+PROBE: NOT DETECTED
HBV SURFACE AG SER QL: NONREACTIVE
HCV AB SER QL: NONREACTIVE
HCV S/CO RATIO: 0.08 S/CO
HIV1+2 AB SPEC QL IA.RAPID: NONREACTIVE
N GONORRHOEA RRNA SPEC QL NAA+PROBE: NOT DETECTED
SOURCE AMPLIFICATION: NORMAL
T PALLIDUM AB SER QL IA: NEGATIVE
T VAGINALIS RRNA SPEC QL NAA+PROBE: NOT DETECTED